# Patient Record
Sex: MALE | Race: WHITE | NOT HISPANIC OR LATINO | Employment: FULL TIME | URBAN - METROPOLITAN AREA
[De-identification: names, ages, dates, MRNs, and addresses within clinical notes are randomized per-mention and may not be internally consistent; named-entity substitution may affect disease eponyms.]

---

## 2017-03-03 ENCOUNTER — HOSPITAL ENCOUNTER (EMERGENCY)
Facility: CLINIC | Age: 52
Discharge: HOME | End: 2017-03-03
Attending: EMERGENCY MEDICINE | Admitting: EMERGENCY MEDICINE
Payer: COMMERCIAL

## 2017-03-03 ENCOUNTER — APPOINTMENT (OUTPATIENT)
Dept: GENERAL RADIOLOGY | Facility: CLINIC | Age: 52
End: 2017-03-03
Attending: EMERGENCY MEDICINE
Payer: COMMERCIAL

## 2017-03-03 VITALS
DIASTOLIC BLOOD PRESSURE: 102 MMHG | RESPIRATION RATE: 16 BRPM | TEMPERATURE: 97.8 F | SYSTOLIC BLOOD PRESSURE: 149 MMHG | HEART RATE: 82 BPM | OXYGEN SATURATION: 99 %

## 2017-03-03 DIAGNOSIS — Z78.9 ALCOHOL USE: ICD-10-CM

## 2017-03-03 DIAGNOSIS — S20.211D CONTUSION OF RIB, RIGHT, SUBSEQUENT ENCOUNTER: ICD-10-CM

## 2017-03-03 DIAGNOSIS — S51.012A LACERATION OF LEFT ELBOW, INITIAL ENCOUNTER: ICD-10-CM

## 2017-03-03 PROCEDURE — 12002 RPR S/N/AX/GEN/TRNK2.6-7.5CM: CPT

## 2017-03-03 PROCEDURE — 99283 EMERGENCY DEPT VISIT LOW MDM: CPT

## 2017-03-03 PROCEDURE — 73080 X-RAY EXAM OF ELBOW: CPT | Mod: LT

## 2017-03-03 ASSESSMENT — ENCOUNTER SYMPTOMS: WOUND: 1

## 2017-03-03 NOTE — ED AVS SNAPSHOT
Emergency Department    [address] [facility] [address]    Phone:  [phone]    Fax:  [phone]                                       Jesus Ortega   MRN: [id number]    Department:   Emergency Department   Date of Visit:  3/3/2017           Patient Information     Date Of Birth          1965        Your diagnoses for this visit were:     Laceration of left elbow, initial encounter     Contusion of rib, right, subsequent encounter     Alcohol use        You were seen by Jayna Muro MD and Ruel Law MD.      Follow-up Information     Schedule an appointment as soon as possible for a visit with [facility], [facility].    Why:  for suture removal in 10-14 days    Contact information:    [address]  [facility] [address] [phone]          Follow up with  Emergency Department.    Specialty:  EMERGENCY MEDICINE    Why:  As needed, If symptoms worsen    Contact information:    [address] [facility] [address] [phone]        Discharge Instructions       Discharge Instructions  Laceration (Cut)    You were seen today for a laceration (cut).  Your doctor examined your laceration for any problems such a buried foreign body (like glass, a splinter, or gravel), or injury to blood vessels, tendons, and nerves.  Your doctor may have also rinsed and/or scrubbed your laceration to help prevent an infection.  Your laceration may have been closed with glue, staples or sutures (stitches).      It may not be possible to find all problems with your laceration on the first visit, and we can't always prevent infections.  Antibiotics are only given when the benefit is more than the risk, and don't prevent all infections. Some lacerations are too high risk to close, and are left open to heal.  All lacerations, no matter how expertly repaired, will cause scarring.    Return to the Emergency Department right away if:    You have more  redness, swelling, pain, drainage (pus), a bad smell, or red streaking from your laceration.      You have a fever of 101oF or more.    You have bleeding that you can t stop at home. If your cut starts to bleed, hold pressure on the bleeding area with a clean cloth or put pressure over the bandage.  If the bleeding doesn t stop after using constant pressure for 30 minutes, you should return to the Emergency Department for further treatment.    An area past the laceration is cool, pale, or blue compared with the other side, or has a slower return of color when squeezed.    Your dressing seems too tight or starts to get uncomfortable or painful.    You have loss of normal function or use of an area, such as being unable to straighten or bend a finger normally.    You have a numb area past the laceration.    Return to the Emergency Department or see your regular doctor if:    The laceration starts to come open.     You have something coming out of the cut or a feeling that there is something in the laceration.    Your wound will not heal, or keeps breaking open. There can always be glass, wood, dirt or other things in any wound.  They won t always show up, even on x-rays.  If a wound doesn t heal, this may be why, and it is important to follow-up with your regular doctor.    Home Care:    Take your dressing off in 12 hours, or as instructed by your doctor, to check your laceration. Remove the dressing sooner if it seems too tight or painful, or if it is getting numb, tingly, or pale past the dressing.    Gently wash your laceration 2 times a day with clean cloth and soap.     It is okay to shower, but do not let the laceration soak in water.      If your laceration was closed with wound adhesive or strips: pat it dry and leave it open to the air.     For all other repairs: after you wash your laceration, or at least 2 times a day, apply bacitracin or other antibiotic ointment to the laceration, then cover it with a  "Band-Aid  or gauze.    Keep the laceration clean. Wear gloves or other protective clothing if you are around dirt.    Follow-up:    You need to follow-up with your regular doctor in 10-14 days.    Your sutures or staples need to be removed in 10-14 days. Schedule an appointment with your regular doctor to have this done.    Scars:  To help minimize scarring:    Wear sunscreen over the healed laceration when out in the sun.    Massage the area regularly.    You may use Vitamin E oil.    Wait a year.  Most scars will start to fade within a year.    Probiotics: If you have been given an antibiotic, you may want to also take a probiotic pill or eat yogurt with live cultures. Probiotics have \"good bacteria\" to help your intestines stay healthy. Studies have shown that probiotics help prevent diarrhea and other intestine problems (including C. diff infection) when you take antibiotics. You can buy these without a prescription in the pharmacy section of the store.     If you were given a prescription for medicine here today, be sure to read all of the information (including the package insert) that comes with your prescription.  This will include important information about the medicine, its side effects, and any warnings that you need to know about.  The pharmacist who fills the prescription can provide more information and answer questions you may have about the medicine.  If you have questions or concerns that the pharmacist cannot address, please call or return to the Emergency Department.         Remember that you can always come back to the Emergency Department if you are not able to see your regular doctor in the amount of time listed above, if you get any new symptoms, or if there is anything that worries you.      24 Hour Appointment Hotline       To make an appointment at any [facility] clinic, call [phone] ([phone]). If you don't have a family doctor or clinic, we will help you find one. [facility] " clinics are conveniently located to serve the needs of you and your family.             Review of your medicines      Our records show that you are taking the medicines listed below. If these are incorrect, please call your family doctor or clinic.        Dose / Directions Last dose taken    CYMBALTA PO   Dose:  60 mg        Take 60 mg by mouth daily.   Refills:  0        FLEXERIL PO        Refills:  0        IBUPROFEN PO   Dose:  400 mg        Take 400 mg by mouth every 6 hours as needed.   Refills:  0        KLONOPIN PO        Refills:  0        metoclopramide 10 MG tablet   Commonly known as:  REGLAN   Dose:  10 mg   Quantity:  15 tablet        Take 1 tablet (10 mg) by mouth 4 times daily as needed   Refills:  0                Procedures and tests performed during your visit     Elbow  XR, G/E 3 views, left      Orders Needing Specimen Collection     None      Pending Results     No orders found from 3/1/2017 to 3/4/2017.            Pending Culture Results     No orders found from 3/1/2017 to 3/4/2017.             Test Results from your hospital stay     3/3/2017  3:04 PM - Interface, Radiant Ib      Narrative     ELBOW LEFT THREE OR MORE VIEWS   3/3/2017 2:55 PM     HISTORY: Elbow pain after fall.    COMPARISON: 3/6/2010    FINDINGS: Negative left elbow. No interval change.        Impression     IMPRESSION: Negative.    BRENDON TA MD                Clinical Quality Measure: Blood Pressure Screening     Your blood pressure was checked while you were in the emergency department today. The last reading we obtained was  BP: (!) 145/102 . Please read the guidelines below about what these numbers mean and what you should do about them.  If your systolic blood pressure (the top number) is less than 120 and your diastolic blood pressure (the bottom number) is less than 80, then your blood pressure is normal. There is nothing more that you need to do about it.  If your systolic blood pressure (the top number) is  "120-139 or your diastolic blood pressure (the bottom number) is 80-89, your blood pressure may be higher than it should be. You should have your blood pressure rechecked within a year by a primary care provider.  If your systolic blood pressure (the top number) is 140 or greater or your diastolic blood pressure (the bottom number) is 90 or greater, you may have high blood pressure. High blood pressure is treatable, but if left untreated over time it can put you at risk for heart attack, stroke, or kidney failure. You should have your blood pressure rechecked by a primary care provider within the next 4 weeks.  If your provider in the emergency department today gave you specific instructions to follow-up with your doctor or provider even sooner than that, you should follow that instruction and not wait for up to 4 weeks for your follow-up visit.        Thank you for choosing [facility]       Thank you for choosing [facility] for your care. Our goal is always to provide you with excellent care. Hearing back from our patients is one way we can continue to improve our services. Please take a few minutes to complete the written survey that you may receive in the mail after you visit with us. Thank you!        [employer] Information     [employer] lets you send messages to your doctor, view your test results, renew your prescriptions, schedule appointments and more. To sign up, go to [email or web address] . Click on \"Log in\" on the left side of the screen, which will take you to the Welcome page. Then click on \"Sign up Now\" on the right side of the page.     You will be asked to enter the access code listed below, as well as some personal information. Please follow the directions to create your username and password.     Your access code is: [email or web address]  Expires: 2017  3:41 PM     Your access code will  in 90 days. If you need help or a new code, please call your [facility] clinic or [phone].        Care EveryWhere " ID     This is your Care EveryWhere ID. This could be used by other organizations to access your [facility] medical records  [id number]        After Visit Summary       This is your record. Keep this with you and show to your community pharmacist(s) and doctor(s) at your next visit.

## 2017-03-03 NOTE — ED AVS SNAPSHOT
Emergency Department    [address] [place] [address]    Phone:  [phone]    Fax:  [phone]                                       Jesus Ortega   MRN: [id number]    Department:   Emergency Department   Date of Visit:  3/3/2017           After Visit Summary Signature Page     I have received my discharge instructions, and my questions have been answered. I have discussed any challenges I see with this plan with the nurse or doctor.    ..........................................................................................................................................  Patient/Patient Representative Signature      ..........................................................................................................................................  Patient Representative Print Name and Relationship to Patient    ..................................................               ................................................  Date                                            Time    ..........................................................................................................................................  Reviewed by Signature/Title    ...................................................              ..............................................  Date                                                            Time

## 2017-03-03 NOTE — DISCHARGE INSTRUCTIONS
Discharge Instructions  Laceration (Cut)    You were seen today for a laceration (cut).  Your doctor examined your laceration for any problems such a buried foreign body (like glass, a splinter, or gravel), or injury to blood vessels, tendons, and nerves.  Your doctor may have also rinsed and/or scrubbed your laceration to help prevent an infection.  Your laceration may have been closed with glue, staples or sutures (stitches).      It may not be possible to find all problems with your laceration on the first visit, and we can't always prevent infections.  Antibiotics are only given when the benefit is more than the risk, and don't prevent all infections. Some lacerations are too high risk to close, and are left open to heal.  All lacerations, no matter how expertly repaired, will cause scarring.    Return to the Emergency Department right away if:    You have more redness, swelling, pain, drainage (pus), a bad smell, or red streaking from your laceration.      You have a fever of 101oF or more.    You have bleeding that you can t stop at home. If your cut starts to bleed, hold pressure on the bleeding area with a clean cloth or put pressure over the bandage.  If the bleeding doesn t stop after using constant pressure for 30 minutes, you should return to the Emergency Department for further treatment.    An area past the laceration is cool, pale, or blue compared with the other side, or has a slower return of color when squeezed.    Your dressing seems too tight or starts to get uncomfortable or painful.    You have loss of normal function or use of an area, such as being unable to straighten or bend a finger normally.    You have a numb area past the laceration.    Return to the Emergency Department or see your regular doctor if:    The laceration starts to come open.     You have something coming out of the cut or a feeling that there is something in the laceration.    Your wound will not heal, or keeps breaking  "open. There can always be glass, wood, dirt or other things in any wound.  They won t always show up, even on x-rays.  If a wound doesn t heal, this may be why, and it is important to follow-up with your regular doctor.    Home Care:    Take your dressing off in 12 hours, or as instructed by your doctor, to check your laceration. Remove the dressing sooner if it seems too tight or painful, or if it is getting numb, tingly, or pale past the dressing.    Gently wash your laceration 2 times a day with clean cloth and soap.     It is okay to shower, but do not let the laceration soak in water.      If your laceration was closed with wound adhesive or strips: pat it dry and leave it open to the air.     For all other repairs: after you wash your laceration, or at least 2 times a day, apply bacitracin or other antibiotic ointment to the laceration, then cover it with a Band-Aid  or gauze.    Keep the laceration clean. Wear gloves or other protective clothing if you are around dirt.    Follow-up:    You need to follow-up with your regular doctor in 10-14 days.    Your sutures or staples need to be removed in 10-14 days. Schedule an appointment with your regular doctor to have this done.    Scars:  To help minimize scarring:    Wear sunscreen over the healed laceration when out in the sun.    Massage the area regularly.    You may use Vitamin E oil.    Wait a year.  Most scars will start to fade within a year.    Probiotics: If you have been given an antibiotic, you may want to also take a probiotic pill or eat yogurt with live cultures. Probiotics have \"good bacteria\" to help your intestines stay healthy. Studies have shown that probiotics help prevent diarrhea and other intestine problems (including C. diff infection) when you take antibiotics. You can buy these without a prescription in the pharmacy section of the store.     If you were given a prescription for medicine here today, be sure to read all of the information " (including the package insert) that comes with your prescription.  This will include important information about the medicine, its side effects, and any warnings that you need to know about.  The pharmacist who fills the prescription can provide more information and answer questions you may have about the medicine.  If you have questions or concerns that the pharmacist cannot address, please call or return to the Emergency Department.         Remember that you can always come back to the Emergency Department if you are not able to see your regular doctor in the amount of time listed above, if you get any new symptoms, or if there is anything that worries you.

## 2017-03-03 NOTE — ED NOTES
Pt put on a hold per Dr Law.   Per dr Law, pt is not free to leave with out roommate .  Pt fighting with Roommate and roommate left

## 2017-03-03 NOTE — ED NOTES
"Patient becoming more restless, states he is feeling \"locked in a tiny room\". Moved to locked area so that patient able to ambulate.   "

## 2017-03-03 NOTE — ED PROVIDER NOTES
"  History     Chief Complaint:  Elbow cut    HPI   Jesus Ortega is a 51 year old male with a history of anxiety, depression and chronic back pain who presents to the emergency department today for evaluation of an elbow injury.  He fell on a low stair today and sustaining a laceration to the area. He reports that he only fell a few feet and did not hit his head or loss of consciousness. His roommate was home but did not directly witness the fall. She first noticed the laceration when she went to wake the patient up from a nap and noticed that there was blood on his sheets.  The patient states that he fell due to any injury he sustained to his right side of his chestearlier this week. He had been at work and injured the area while lifting something 3 days ago. He was seen at [facility] yesterday and had a chest x-ray performed, which was normal per his report. Patient also states that he had drank \"3 strong beers\" prior to his fall today but is refusing a breathalyzer in the ED at this time. He states he drinks on Fridays and Saturday when asked how much alcohol he consumes. Patient's roommate is here and is able to drive the patient home after his laceration is repaired. Of note, patient states that he has seen his PCP within the last year.  Roommate confirms that he is acting normal.    Allergies:  Penicillins, rash  Sulfa Drugs, rash     Medications:    Flexeril   Reglan  Klonopin  Cymbalta    Past Medical History:    Anxiety   Chronic back pain  Depressive disorder    Past Surgical History:    Herniorrhaphy inguinal, left (1985)     Family History:    History reviewed. No pertinent family history.     Social History:  The patient was accompanied to the ED by his roommate.  Smoking Status: Current Every Day Smoker  Alcohol Use: Weekly  Marital Status:   [2]     Review of Systems   Skin: Positive for wound.   All other systems reviewed and are negative.    Physical Exam   Vitals: "   Patient Vitals for the past 24 hrs:   BP Temp Temp src Pulse Heart Rate Resp SpO2   03/03/17 1652 (!) 149/102 - - - 86 16 99 %   03/03/17 1650 (!) 141/111 - - - - - -   03/03/17 1332 (!) 145/102 97.8  F (36.6  C) Oral 82 82 18 99 %       Physical Exam  General: male sitting upright, female roommate at bedside  HENT: face nontender with full painless ROM mandible, no bony deformity, OP clear, no difficulty controlling secretions, skull nontender  Eyes: PERRL without proptosis  CV:  regular rhythm, cap refill normal in all extremities  Resp: CTAB, normal effort, no crackles or wheezing  GI: abdomen soft,  nontender, no guarding  MSK:  Cervical spine:  no midline tenderness, FROM  Thoracic spine: no midline tenderness, no CVAT  Lumbar spine: no midline tenderness  Chest wall: nontender without crepitus (even in area of recent injury)  Pelvis stable  Extremities: no focal tenderness to LUE including elbow, which has good ROM  Skin:   No abrasion  No ecchymosis  Moderately gaping irregularly shaped laceration to posterior L elbow  Neuro: awake, alert, GCS 15, responds appropriately to commands, ambulatory with steady gait  Psych: refuses breathalyzer, somewhat vague historian      Emergency Department Course     Imaging:  Radiology findings were communicated with the patient who voiced understanding of the findings.    ElbowXR, G/E 3 views, left:   IMPRESSION: Negative.  Reading per radiology    Procedures:  Procedure Note: Laceration Repair   Performed by: Ruel Law MD    Verbal consent given by patient who confirms understanding of the procedure being performed after discussing the risks, benefits and alternatives.    Preparation: Patient was prepped and draped in usual sterile fashion, with assistance from ERT.  Irrigation solution: saline    Body area: Left elbow  Laceration length: 3.5 cm  Contamination: The wound is not grossly contaminated.  Foreign bodies: none  Tendon involvement:  none  Anesthesia: Local   Local anesthetic: Bupivacaine 0.5% with epinephrine    Debridement: sharp excisional debridement was performed  Skin closure: Closed with 4 x 4.0 Ethilon   Technique: interrupted  Approximation: close  Approximation difficulty: simple    Patient tolerated the procedure well with no immediate complications.    Emergency Department Course:  Nursing notes and vitals reviewed.  I performed an exam of the patient as documented above.   The patient was sent for an x-ray while in the emergency department, results above.   At 1625 the patient was rechecked and was updated on the results of his imaging studies and laceration was repaired as noted above.    I discussed the treatment plan with the patient and roommate. They expressed understanding of this plan and consented to discharge. They will be discharged home with instructions for care and follow up. In addition, the patient will return to the emergency department if their symptoms persist, worsen, if new symptoms arise or if there is any concern.  All questions were answered.  I personally reviewed the imaging results with the patient and answered all related questions prior to discharge.    Impression & Plan      Medical Decision Making:  Jesus Ortega is a 51 year old male presents for treatment of left elbow injury. He has an elbow laceration that was repaired as documented as above. There is no evidence that it extends into the elbow joint or that there is any fracture or bony problem.  He is advised that the location over the joint does place him at somewhat higher risk for poor wound healing and a dressing was placed. He should have sutures out in 10-14 days, and be seen sooner if any healing complications.    The patient admits to having several drinks earlier today, though was adamant about not having a breathalyzer done. He is here with his completely sober  who will be taking him home. I do not think that any recent  alcohol use is impairing his judgement such that he requires head imaging such as CT head or cervical spine. Detailed head to toe exam shows no other worrisome process. He can return for unexpected deterioration at any hour.     Diagnosis:    ICD-10-CM    1. Laceration of left elbow, initial encounter S51.012A    2. Contusion of rib, right, subsequent encounter S20.211D    3. Alcohol use Z78.9      Disposition:   Discharge to home    Scribe Disclosure:  I, Selena Lebron, am serving as a scribe at 3:17 PM on 3/3/2017 to document services personally performed by Ruel Law MD, based on my observations and the provider's statements to me.    3/3/2017    EMERGENCY DEPARTMENT       Ruel Law MD  03/04/17 1670

## 2019-03-14 ENCOUNTER — OFFICE VISIT (OUTPATIENT)
Dept: URGENT CARE | Facility: URGENT CARE | Age: 54
End: 2019-03-14
Payer: OTHER MISCELLANEOUS

## 2019-03-14 VITALS
RESPIRATION RATE: 16 BRPM | DIASTOLIC BLOOD PRESSURE: 82 MMHG | TEMPERATURE: 98.5 F | SYSTOLIC BLOOD PRESSURE: 138 MMHG | HEART RATE: 91 BPM | OXYGEN SATURATION: 98 %

## 2019-03-14 DIAGNOSIS — S09.90XA CLOSED HEAD INJURY, INITIAL ENCOUNTER: ICD-10-CM

## 2019-03-14 DIAGNOSIS — S09.90XA INJURY OF HEAD, INITIAL ENCOUNTER: Primary | ICD-10-CM

## 2019-03-14 DIAGNOSIS — S01.01XA LACERATION OF SCALP, INITIAL ENCOUNTER: ICD-10-CM

## 2019-03-14 PROCEDURE — 99203 OFFICE O/P NEW LOW 30 MIN: CPT | Mod: 25 | Performed by: PHYSICIAN ASSISTANT

## 2019-03-14 PROCEDURE — 90471 IMMUNIZATION ADMIN: CPT | Performed by: PHYSICIAN ASSISTANT

## 2019-03-14 PROCEDURE — 12001 RPR S/N/AX/GEN/TRNK 2.5CM/<: CPT | Performed by: PHYSICIAN ASSISTANT

## 2019-03-14 PROCEDURE — 90714 TD VACC NO PRESV 7 YRS+ IM: CPT | Performed by: PHYSICIAN ASSISTANT

## 2019-03-14 NOTE — PROGRESS NOTES
SUBJECTIVE:   Jesus Ortega is a 53 year old male presenting with a chief complaint of having scalp laceration, head pain.  Onset of symptoms was 1 hour ago .  Course of illness is improving.    Severity moderate  Current and Associated symptoms: head injury and scalp laceration  Treatment measures tried include ice and compression to cut on head.  Predisposing factors include none.    Past Medical History:   Diagnosis Date     Anxiety      Chronic back pain      Depressive disorder         Allergies   Allergen Reactions     Penicillins Rash     Sulfa Drugs Rash       Family HX  Allergies  HTN  CVD      Social History     Tobacco Use     Smoking status: Current Every Day Smoker     Packs/day: 1.00     Smokeless tobacco: Never Used   Substance Use Topics     Alcohol use: Yes     Comment: weekly       ROS:  CONSTITUTIONAL:NEGATIVE for fever, chills, change in weight  INTEGUMENTARY/SKIN: POSITIVE for scalp laceration  EYES: NEGATIVE for vision changes or irritation  ENT/MOUTH: NEGATIVE for ear, mouth and throat problems  RESP:NEGATIVE for significant cough or SOB  CV: NEGATIVE for chest pain, palpitations or peripheral edema  GI: NEGATIVE for nausea, abdominal pain, heartburn, or change in bowel habits  : NEGATIVE for dysuria  MUSCULOSKELETAL: NEGATIVE for significant arthralgias or myalgia  NEURO: NEGATIVE for weakness, dizziness or paresthesias    OBJECTIVE  :/82   Pulse 91   Temp 98.5  F (36.9  C) (Oral)   Resp 16   SpO2 98%   GENERAL APPEARANCE: healthy, alert and no distress  EYES: EOMI,  PERRL, conjunctiva clear  HENT: ear canals and TM's normal.  Nose and mouth without ulcers, erythema or lesions  NECK: supple, nontender, no lymphadenopathy  RESP: lungs clear to auscultation - no rales, rhonchi or wheezes  CV: regular rates and rhythm, normal S1 S2, no murmur noted  ABDOMEN:  soft, nontender, no HSM or masses and bowel sounds normal  Extremities: no peripheral edema or tenderness, peripheral  pulses normal  MS: extremities normal- no gross deformities noted, no erythema, FROM noted in all extremities  NEURO: Normal strength and tone, sensory exam grossly normal,  normal speech and mentation. CN 2-12 grossly intact. PERRLA, EOMI  SKIN: no suspicious lesions or rashes    ASSESSMENT/PLAN      ICD-10-CM    1. Injury of head, initial encounter S09.90XA TD PRESERV FREE, IM (7+ YRS)   2. Laceration of scalp, initial encounter S01.01XA TD PRESERV FREE, IM (7+ YRS)     REPAIR SUPERFICIAL, WOUND BODY < =2.5CM   3. Closed head injury, initial encounter S09.90XA        Orders Placed This Encounter     TD PRESERV FREE, IM (7+ YRS)     Head information reviewed with patient  Tylenol for pain  Monitor for any worsening symptoms  Go to the ED if this occurs    PROCEDURE:  LET applied  Wound cleaned with surecleans  3 staples placed in head  Patient tolerated procedure  Remove staples in 7 days

## 2023-03-29 ENCOUNTER — HOSPITAL ENCOUNTER (EMERGENCY)
Facility: CLINIC | Age: 58
Discharge: HOME | End: 2023-03-29
Attending: EMERGENCY MEDICINE | Admitting: EMERGENCY MEDICINE
Payer: COMMERCIAL

## 2023-03-29 ENCOUNTER — APPOINTMENT (OUTPATIENT)
Dept: GENERAL RADIOLOGY | Facility: CLINIC | Age: 58
End: 2023-03-29
Attending: EMERGENCY MEDICINE
Payer: COMMERCIAL

## 2023-03-29 VITALS
DIASTOLIC BLOOD PRESSURE: 93 MMHG | SYSTOLIC BLOOD PRESSURE: 144 MMHG | HEART RATE: 85 BPM | RESPIRATION RATE: 16 BRPM | OXYGEN SATURATION: 97 % | TEMPERATURE: 98.8 F

## 2023-03-29 DIAGNOSIS — J98.01 BRONCHOSPASM: ICD-10-CM

## 2023-03-29 DIAGNOSIS — R07.9 CHEST PAIN, UNSPECIFIED TYPE: ICD-10-CM

## 2023-03-29 DIAGNOSIS — R06.02 SHORTNESS OF BREATH: ICD-10-CM

## 2023-03-29 LAB
ANION GAP SERPL CALCULATED.3IONS-SCNC: 8 MMOL/L (ref 7–15)
BASOPHILS # BLD AUTO: 0.1 10E3/UL (ref 0–0.2)
BASOPHILS NFR BLD AUTO: 0 %
BUN SERPL-MCNC: 11.4 MG/DL (ref 6–20)
CALCIUM SERPL-MCNC: 9.1 MG/DL (ref 8.6–10)
CHLORIDE SERPL-SCNC: 100 MMOL/L (ref 98–107)
CREAT SERPL-MCNC: 0.84 MG/DL (ref 0.67–1.17)
D DIMER PPP FEU-MCNC: <0.27 UG/ML FEU (ref 0–0.5)
DEPRECATED HCO3 PLAS-SCNC: 27 MMOL/L (ref 22–29)
EOSINOPHIL # BLD AUTO: 0.2 10E3/UL (ref 0–0.7)
EOSINOPHIL NFR BLD AUTO: 2 %
ERYTHROCYTE [DISTWIDTH] IN BLOOD BY AUTOMATED COUNT: 14.7 % (ref 10–15)
GFR SERPL CREATININE-BSD FRML MDRD: >90 ML/MIN/1.73M2
GLUCOSE SERPL-MCNC: 97 MG/DL (ref 70–99)
HCT VFR BLD AUTO: 43 % (ref 40–53)
HGB BLD-MCNC: 14.5 G/DL (ref 13.3–17.7)
HOLD SPECIMEN: NORMAL
IMM GRANULOCYTES # BLD: 0 10E3/UL
IMM GRANULOCYTES NFR BLD: 0 %
LYMPHOCYTES # BLD AUTO: 4.6 10E3/UL (ref 0.8–5.3)
LYMPHOCYTES NFR BLD AUTO: 39 %
MCH RBC QN AUTO: 31.6 PG (ref 26.5–33)
MCHC RBC AUTO-ENTMCNC: 33.7 G/DL (ref 31.5–36.5)
MCV RBC AUTO: 94 FL (ref 78–100)
MONOCYTES # BLD AUTO: 1.5 10E3/UL (ref 0–1.3)
MONOCYTES NFR BLD AUTO: 13 %
NEUTROPHILS # BLD AUTO: 5.4 10E3/UL (ref 1.6–8.3)
NEUTROPHILS NFR BLD AUTO: 46 %
NRBC # BLD AUTO: 0 10E3/UL
NRBC BLD AUTO-RTO: 0 /100
PLATELET # BLD AUTO: 455 10E3/UL (ref 150–450)
POTASSIUM SERPL-SCNC: 4.2 MMOL/L (ref 3.4–5.3)
RBC # BLD AUTO: 4.59 10E6/UL (ref 4.4–5.9)
SODIUM SERPL-SCNC: 135 MMOL/L (ref 136–145)
TROPONIN T SERPL HS-MCNC: 7 NG/L
WBC # BLD AUTO: 11.7 10E3/UL (ref 4–11)

## 2023-03-29 PROCEDURE — 250N000012 HC RX MED GY IP 250 OP 636 PS 637: Performed by: EMERGENCY MEDICINE

## 2023-03-29 PROCEDURE — 80048 BASIC METABOLIC PNL TOTAL CA: CPT | Performed by: EMERGENCY MEDICINE

## 2023-03-29 PROCEDURE — 93005 ELECTROCARDIOGRAM TRACING: CPT

## 2023-03-29 PROCEDURE — 99285 EMERGENCY DEPT VISIT HI MDM: CPT | Mod: 25

## 2023-03-29 PROCEDURE — 71046 X-RAY EXAM CHEST 2 VIEWS: CPT

## 2023-03-29 PROCEDURE — 94640 AIRWAY INHALATION TREATMENT: CPT

## 2023-03-29 PROCEDURE — 84484 ASSAY OF TROPONIN QUANT: CPT | Performed by: EMERGENCY MEDICINE

## 2023-03-29 PROCEDURE — 85025 COMPLETE CBC W/AUTO DIFF WBC: CPT | Performed by: EMERGENCY MEDICINE

## 2023-03-29 PROCEDURE — 85379 FIBRIN DEGRADATION QUANT: CPT | Performed by: EMERGENCY MEDICINE

## 2023-03-29 PROCEDURE — 36415 COLL VENOUS BLD VENIPUNCTURE: CPT | Performed by: EMERGENCY MEDICINE

## 2023-03-29 PROCEDURE — 250N000009 HC RX 250: Performed by: EMERGENCY MEDICINE

## 2023-03-29 RX ORDER — PREDNISONE 20 MG/1
60 TABLET ORAL ONCE
Status: COMPLETED | OUTPATIENT
Start: 2023-03-29 | End: 2023-03-29

## 2023-03-29 RX ORDER — AZITHROMYCIN 250 MG/1
TABLET, FILM COATED ORAL
Qty: 6 TABLET | Refills: 0 | Status: SHIPPED | OUTPATIENT
Start: 2023-03-29 | End: 2023-04-03

## 2023-03-29 RX ORDER — IPRATROPIUM BROMIDE AND ALBUTEROL SULFATE 2.5; .5 MG/3ML; MG/3ML
6 SOLUTION RESPIRATORY (INHALATION) ONCE
Status: COMPLETED | OUTPATIENT
Start: 2023-03-29 | End: 2023-03-29

## 2023-03-29 RX ORDER — PREDNISONE 20 MG/1
TABLET ORAL
Qty: 10 TABLET | Refills: 0 | Status: SHIPPED | OUTPATIENT
Start: 2023-03-30

## 2023-03-29 RX ADMIN — PREDNISONE 60 MG: 20 TABLET ORAL at 17:03

## 2023-03-29 RX ADMIN — IPRATROPIUM BROMIDE AND ALBUTEROL SULFATE 6 ML: .5; 3 SOLUTION RESPIRATORY (INHALATION) at 19:38

## 2023-03-29 ASSESSMENT — ACTIVITIES OF DAILY LIVING (ADL)
ADLS_ACUITY_SCORE: 33
ADLS_ACUITY_SCORE: 35

## 2023-03-29 ASSESSMENT — ENCOUNTER SYMPTOMS: SHORTNESS OF BREATH: 1

## 2023-03-29 NOTE — ED TRIAGE NOTES
Pt reports that he has had chest pain off and on for a couple months. Pt states that activity seems to make the pain worse. Pain is on both sides of the chest. Pt is alert and ambulatory. Went to the clinic today and was referred to ED. NO pain at this time. Chest xr and ekg done at clinic.

## 2023-03-29 NOTE — ED NOTES
PIT/Triage Evaluation    Patient presented with chest pain intermittently over past few months. Pt is heavy smoker. Sx onset after bout of PNA, he thinks. Also having episodes heart racing. Pt started checking BP last week and BPs were in the 150 range which made pt worried. Pt has painful episodes that last up to 1 hour and are mild in nature. Affects both sides of the chest at different times. NO clear precipitating or modifying factors. Pt gets dyspnea with exertion and wheezing but no CP with exertion. Pt has chronic cough.     Patient denies immobilization for >3 days or surgery within the previous 4 weeks, history of DVT or PE, hemoptysis, malignancy with treatment within previous 6 months or palliative therapy, or exogenous estrogen/testosterone use.     Exam is notable for:  BL exp wheezing. Heart RRR.    Appropriate interventions for symptom management were initiated if applicable.  Appropriate diagnostic tests were initiated if indicated.    Important information for subsequent clinician:    I briefly evaluated the patient and developed an initial plan of care. I discussed this plan and explained that this brief interaction does not constitute a full evaluation. Patient/family understands that they should wait to be fully evaluated and discuss any test results with another clinician prior to leaving the hospital.       Todd Veliz MD  03/29/23 1654       Todd Veliz MD  03/29/23 1708

## 2023-03-30 LAB
ATRIAL RATE - MUSE: 69 BPM
DIASTOLIC BLOOD PRESSURE - MUSE: NORMAL MMHG
INTERPRETATION ECG - MUSE: NORMAL
P AXIS - MUSE: 37 DEGREES
PR INTERVAL - MUSE: 168 MS
QRS DURATION - MUSE: 88 MS
QT - MUSE: 434 MS
QTC - MUSE: 465 MS
R AXIS - MUSE: 32 DEGREES
SYSTOLIC BLOOD PRESSURE - MUSE: NORMAL MMHG
T AXIS - MUSE: 50 DEGREES
VENTRICULAR RATE- MUSE: 69 BPM

## 2023-03-30 NOTE — ED PROVIDER NOTES
History     Chief Complaint:  Chest Pain     HPI   Jesus Ortega is a 57 year old male who presents with chest pain intermittently over past few months. Patient is heavy smoker. Onset of symptoms after bout of PNA, he thinks. Also having episodes heart racing. Patient started checking BP last week and BPs were in the 150 range which made him worried. He has chest pain episodes that last up to 1 hour and are mild in nature. Affects both sides of the chest at different times. No clear precipitating or modifying factors. Dyspnea with exertion and wheezing but no chest pain with exertion. Chronic cough. He denies immobilization for >3 days or surgery within the previous 4 weeks, history of DVT or PE, hemoptysis, malignancy with treatment within previous 6 months or palliative therapy, or exogenous estrogen/testosterone use.    Independent Historian:   None - Patient Only    ROS:  Review of Systems   Respiratory: Positive for shortness of breath.         No hemoptysis   Cardiovascular: Positive for chest pain.   All other systems reviewed and are negative.      Allergies:  Penicillins  Sulfa Drugs     Medications:    Verapamil  Hydroxyzine    Past Medical History:    Tobacco use disorder  Generalized anxiety disorder  Adjustment disorder with mixed anxiety and depressed mood  Sleep apnea  Major depressive disorder  Cervicalgia     Past Surgical History:    Herniorrhaphy     Family History:    Father- type II diabetes mellitus  Sister- migraines    Social History:  The patient presents via private vehicle  Current smoker    Physical Exam     Patient Vitals for the past 24 hrs:   BP Temp Temp src Pulse Resp SpO2   03/29/23 1932 -- -- -- -- -- 97 %   03/29/23 1931 (!) 144/93 -- -- 85 -- 97 %   03/29/23 1606 (!) 145/90 98.8  F (37.1  C) Temporal 77 16 98 %        Physical Exam  VS: Reviewed per above  HENT: normal speech  EYES: sclera anicteric  CV: Rate as noted, regular rhythm.   RESP: Effort normal. BL exp  wheezing  GI: no tenderness/rebound/guarding, not distended.  NEURO: Alert, moving all extremities  MSK: No deformity of the extremities  SKIN: Warm and dry    Emergency Department Course   ECG  ECG taken at 1644, ECG read at 1645  Sinus rhythm   No PVCs as compared to prior, dated 7/30/2009.  Rate 69 bpm. GA interval 168 ms. QRS duration 88 ms. QT/QTc 434/465 ms. P-R-T axes 37 32 50.      Imaging:  XR Chest 2 Views   Final Result   IMPRESSION: Heart size is normal. No pleural effusion, pneumothorax, or abnormal area of consolidation.      Echo Stress Echocardiogram    (Results Pending)      Report per radiology    Laboratory:  Labs Ordered and Resulted from Time of ED Arrival to Time of ED Departure   BASIC METABOLIC PANEL - Abnormal       Result Value    Sodium 135 (*)     Potassium 4.2      Chloride 100      Carbon Dioxide (CO2) 27      Anion Gap 8      Urea Nitrogen 11.4      Creatinine 0.84      Calcium 9.1      Glucose 97      GFR Estimate >90     CBC WITH PLATELETS AND DIFFERENTIAL - Abnormal    WBC Count 11.7 (*)     RBC Count 4.59      Hemoglobin 14.5      Hematocrit 43.0      MCV 94      MCH 31.6      MCHC 33.7      RDW 14.7      Platelet Count 455 (*)     % Neutrophils 46      % Lymphocytes 39      % Monocytes 13      % Eosinophils 2      % Basophils 0      % Immature Granulocytes 0      NRBCs per 100 WBC 0      Absolute Neutrophils 5.4      Absolute Lymphocytes 4.6      Absolute Monocytes 1.5 (*)     Absolute Eosinophils 0.2      Absolute Basophils 0.1      Absolute Immature Granulocytes 0.0      Absolute NRBCs 0.0     TROPONIN T, HIGH SENSITIVITY - Normal    Troponin T, High Sensitivity 7     D DIMER QUANTITATIVE - Normal    D-Dimer Quantitative <0.27          Emergency Department Course & Assessments:     Interventions:  Medications   ipratropium - albuterol 0.5 mg/2.5 mg/3 mL (DUONEB) neb solution 6 mL (6 mLs Nebulization $Given 3/29/23 1938)   predniSONE (DELTASONE) tablet 60 mg (60 mg Oral $Given  3/29/23 1703)      Assessments:  1701 I obtained history and examined the patient as noted above.  1931 I rechecked the patient.      Social Determinants of Health affecting care:   None    Disposition:  The patient was discharged to home.     Impression & Plan      Medical Decision Making:  Patient presents to the ER for evaluation of intermittent chest pain as well as exertional dyspnea.  Vital signs reassuring.  On exam he does have expiratory wheezing bilaterally.  He denies formal diagnosis of COPD but does endorse heavy smoking history.  Suspect underlying COPD.  Symptoms of shortness of breath improved with breathing treatments.  Lower pretest probability and normal D-dimer speaks against occult PE.  Chest x-ray is clear without signs of pneumonia, pneumothorax, pulmonary edema, widened mediastinum to suggest aortic dissection.  Chest pain is atypical for ACS and ECG and troponin test do not suggest myocardial ischemia.  Plan for outpatient stress testing baby aspirin in the interim.  Patient was discharged with prednisone burst, Z-Tyler and instructions to use his home inhaler as needed for recurrent episodes of shortness of breath and wheezing.  Recommended ongoing primary care follow-up to discuss his symptoms as well as borderline blood pressures.  Return precautions discussed prior to discharge.    Diagnosis:    ICD-10-CM    1. Chest pain, unspecified type  R07.9 Echo Stress Echocardiogram      2. Shortness of breath  R06.02       3. Bronchospasm  J98.01            Discharge Medications:  New Prescriptions    AZITHROMYCIN (ZITHROMAX) 250 MG TABLET    Take 2 tablets (500 mg) by mouth daily for 1 day, THEN 1 tablet (250 mg) daily for 4 days.    PREDNISONE (DELTASONE) 20 MG TABLET    Take two tablets (= 40mg) each day for 5 (five) days      Scribe Disclosure:  IEden, am serving as a scribe at 7:28 PM on 3/29/2023 to document services personally performed by Todd Veliz MD based on my  observations and the provider's statements to me.      3/29/2023   Todd Veliz MD Lindenbaum, Elan, MD  03/29/23 [id number]

## 2023-04-14 ENCOUNTER — HOSPITAL ENCOUNTER (OUTPATIENT)
Dept: CARDIOLOGY | Facility: CLINIC | Age: 58
Discharge: HOME | End: 2023-04-14
Attending: EMERGENCY MEDICINE | Admitting: EMERGENCY MEDICINE
Payer: COMMERCIAL

## 2023-04-14 DIAGNOSIS — R07.9 CHEST PAIN, UNSPECIFIED TYPE: ICD-10-CM

## 2023-04-14 PROCEDURE — 999N000208 ECHO STRESS ECHOCARDIOGRAM

## 2023-04-14 PROCEDURE — 93325 DOPPLER ECHO COLOR FLOW MAPG: CPT | Mod: 26 | Performed by: STUDENT IN AN ORGANIZED HEALTH CARE EDUCATION/TRAINING PROGRAM

## 2023-04-14 PROCEDURE — 93321 DOPPLER ECHO F-UP/LMTD STD: CPT | Mod: TC

## 2023-04-14 PROCEDURE — 93350 STRESS TTE ONLY: CPT | Mod: 26 | Performed by: STUDENT IN AN ORGANIZED HEALTH CARE EDUCATION/TRAINING PROGRAM

## 2023-04-14 PROCEDURE — 255N000002 HC RX 255 OP 636: Performed by: STUDENT IN AN ORGANIZED HEALTH CARE EDUCATION/TRAINING PROGRAM

## 2023-04-14 PROCEDURE — 93016 CV STRESS TEST SUPVJ ONLY: CPT | Performed by: STUDENT IN AN ORGANIZED HEALTH CARE EDUCATION/TRAINING PROGRAM

## 2023-04-14 PROCEDURE — 93018 CV STRESS TEST I&R ONLY: CPT | Performed by: STUDENT IN AN ORGANIZED HEALTH CARE EDUCATION/TRAINING PROGRAM

## 2023-04-14 PROCEDURE — 93321 DOPPLER ECHO F-UP/LMTD STD: CPT | Mod: 26 | Performed by: STUDENT IN AN ORGANIZED HEALTH CARE EDUCATION/TRAINING PROGRAM

## 2023-04-14 RX ADMIN — PERFLUTREN 5 ML: 6.52 INJECTION, SUSPENSION INTRAVENOUS at 15:03

## 2023-04-17 ENCOUNTER — NURSE TRIAGE (OUTPATIENT)
Dept: NURSING | Facility: CLINIC | Age: 58
End: 2023-04-17
Payer: COMMERCIAL

## 2023-04-17 NOTE — TELEPHONE ENCOUNTER
"Pt evaluated for chest pain at ED 3/29/2023.  Now seeking advice on follow-up.  Pt reports history of asthma, chronic pneumonia, heavy smoking.  [facility]  Pt already evaluated in ED, already had stress test, needs to contact his own [facility] primary clinic for follow-up on intermittently persistent chest \"discomfort\".    Shortness of breath with exertion.  \"Comes and goes.\"  \"Coughing up white phlegm.\"  Denies any new or worsening symptoms at this time.    Plan of action therefore is for pt to call [facility] immediately for same-day provider eval (in person ideally or at a minimum virtually).  Pt verbalizes clear understanding.  Agrees to plan.  (Understands thoroughly how and when to call 911 if needed).    [facility]RE[facility] Nurse Advisor     Reason for Disposition    Chest pain lasting longer than 5 minutes and ANY of the following:* Over 44 years old* Over 30 years old and at least one cardiac risk factor (e.g., diabetes mellitus, high blood pressure, high cholesterol, smoker, or strong family history of heart disease)* History of heart disease (i.e., angina, heart attack, heart failure, bypass surgery, takes nitroglycerin)* Pain is crushing, pressure-like, or heavy     Pt already evaluated in ED, already had stress test, needs to contact his own [facility] primary clinic for follow-up on intermittently persistent chest \"discomfort\".    Additional Information    Negative: SEVERE difficulty breathing (e.g., struggling for each breath, speaks in single words)    Negative: Passed out (i.e., fainted, collapsed and was not responding)    Negative: Difficult to awaken or acting confused (e.g., disoriented, slurred speech)    Negative: Shock suspected (e.g., cold/pale/clammy skin, too weak to stand, low BP, rapid pulse)    Protocols used: CHEST PAIN-A-OH      "

## 2023-04-17 NOTE — TELEPHONE ENCOUNTER
Called asking for explanation of recent echo stress test, 4/14/23.  I'm unable to do this.  I instructed Jesus to reach out to his pcp with [facility] for answers. He stated understanding and agreement. He gave me the phone number so I transferred him.    Arabella SALAZAR RN [facility]

## 2023-06-18 ENCOUNTER — HEALTH MAINTENANCE LETTER (OUTPATIENT)
Age: 58
End: 2023-06-18

## 2024-08-11 ENCOUNTER — HEALTH MAINTENANCE LETTER (OUTPATIENT)
Age: 59
End: 2024-08-11

## 2025-08-16 ENCOUNTER — HEALTH MAINTENANCE LETTER (OUTPATIENT)
Age: 60
End: 2025-08-16